# Patient Record
Sex: FEMALE | Race: WHITE | NOT HISPANIC OR LATINO | Employment: OTHER | URBAN - METROPOLITAN AREA
[De-identification: names, ages, dates, MRNs, and addresses within clinical notes are randomized per-mention and may not be internally consistent; named-entity substitution may affect disease eponyms.]

---

## 2024-07-19 ENCOUNTER — TRANSFERRED RECORDS (OUTPATIENT)
Dept: HEALTH INFORMATION MANAGEMENT | Facility: CLINIC | Age: 79
End: 2024-07-19

## 2024-07-23 ENCOUNTER — MEDICAL CORRESPONDENCE (OUTPATIENT)
Dept: HEALTH INFORMATION MANAGEMENT | Facility: CLINIC | Age: 79
End: 2024-07-23

## 2024-07-30 ENCOUNTER — TRANSCRIBE ORDERS (OUTPATIENT)
Dept: OTHER | Age: 79
End: 2024-07-30

## 2024-07-30 DIAGNOSIS — D3A.8 NEUROENDOCRINE TUMOR (H): ICD-10-CM

## 2024-07-30 DIAGNOSIS — R74.8 ELEVATED LIVER ENZYMES: Primary | ICD-10-CM

## 2024-08-01 ENCOUNTER — TELEPHONE (OUTPATIENT)
Dept: GASTROENTEROLOGY | Facility: CLINIC | Age: 79
End: 2024-08-01

## 2024-08-01 NOTE — TELEPHONE ENCOUNTER
Left Voicemail (1st Attempt) for the patient to call back and schedule the following:    Appointment type: new  Provider: Dr. Huddleston  Return date: pre reg  Specialty phone number: [phone]   Additional appointment(s) needed:   Additonal Notes:

## 2024-08-02 ENCOUNTER — TELEPHONE (OUTPATIENT)
Dept: GASTROENTEROLOGY | Facility: CLINIC | Age: 79
End: 2024-08-02
Payer: COMMERCIAL

## 2024-08-02 NOTE — TELEPHONE ENCOUNTER
[facility]     Referring provider: Rodriguez Pete NP   [address] [place]   [place] [address]   Phone: [phone]   Fax: [phone]    Referred to: [facility]     Provider Requested: NA     Referral Received: 7/30/24     Records received: Fax (Media tab); Care Everywhere     Images received: Requested 8/5/24; Uploaded to PACS 8/7/24    Insurance Coverage: [facility]    Evaluation for: R74.8 (ICD-10-CM) - Elevated liver enzymes D3A.8 (ICD-10-CM) - Neuroendocrine tumor (H28)  Additional Information:  Requesting higher level of care, advanced MRI, possible surgery     Clinical History (per RN review):   Referring provider clinic note 7/19/24:  HPI  This is a 79 year old female patient.  She is evaluated in gastroenterology for follow up after ERCP/EUS with Dr Walker, June 11, 2024 and Dr George, [facility], 07/05/2024.     She was last seen in GI clinic, 4/10/2024. There had been an adrupt elevation of her liver enzymes, alk janie of 874, , .  She was dx with Covid January 2024 and thought of possible viral, acetaminophen etiology.  Negative viral hepatitis labs.  Her immunoglobulins, AMA, MENDOZA, SMA, lipase were normal.     Ultrasound found benign liver cysts.    MRI pursued  on 4/21/2024 finding new bilary dilation with focal stricturing.  No mass.  Hepatic cysts up to 12h71e63 mm.     She was feeling well, her liver enzymes had improved to AST 61, ALT 77, alk phos 608.  She traveled during the month of May and returned in June. Labs on 6/11/2024, Alk phos 546, AST 57, ALT 69. CA 19-9, 31.     Concern for abnormal appearance left hepatic lobe, possible cholangiocarcinoma on ERCP/EUS 6/11/2024 per Dr Walker, sphincterectomy, stenting completed.  Cytology negative.     ERCP, Dr George did not appreciate the left liver lobe abnormality but saw a significant dilation of right ducts and stenotic area that did not appear to be cholangiocarcinoma given its intrahepatic  "location.  Impression from Dr George:     The collective findings from Dr. Walker's excellent cholangiogram, the        cholangiograms today, and the direct cholangioscopy today suggest an        extrinsic compression in the area of the right anterior duct. It is        unclear whether this is related to hepatic cysts or a malignant        periductal process. I did not have any targets for biopsies today given        the lack of a fixed stricture. The area suggesting extrinsic compression        was somewhat hyperemic, possibly related to the use of brush cytology        and FNA by Dr. Walker. Given the risks of inducing a bile leak and the        lack of clear targets, I did not randomly sample this area, especially        given the lack of significantly elevated liver tests. I removed the        VIABIL stent at the end of the procedure.        Given the equivocal findings, a second opinion at an academic        institution is needed. I highly recommend a high-quality liver MRI,        perhaps with Eovist, comparing it to the index MRI. Furthermore, I        recommend reviewing both Dr. Walker's cholangiogram and my cholangiogram        as well as his biopsies. If this second opinion concurs with our        evaluation, further workup such as a liver biopsy or hepatobiliary        surgery opinion would be warranted. Of note all contrast drained after        this ERCP further affirming lack of fixed obstruction in the biliary        tree.\"     She complains of fatigue, low abdominal and back pain. Her liver enzymes are improving.  No upper abdominal pain, no nausea, vomiting.  Diarrhea has improved with cholestyramine.   Positive dyspepsia that has persisted despite antiacids.  Denies acid reflux.  No rx PPI.     Colonoscopy, Dr. Nii Lyles, May 2022.  Two polyps in the descending colon, 4-5 mm size, tubular adenoma.     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     Carcinoid tumor resection April 2018, pathology differentiated neuroendocrine tumor, grade 1 of 3, 3.5 cm with free margins.  She is followed by oncology.  5-HIAA, 2023 was normal at 5.8.     Right hemocolectomy per Dr. Monterroso, January 7, 2019, pathology showing grade 2 of 4 invading but not through the muscularis propria, sessile serrated adenoma, margins free of tumor.  26 lymph nodes, negative for metastatic carcinoma.     Fibroscan, 12/2021, mild steatosis with no significant fibrosis.     No diabetes.  Glucose normal.       Her weight is stable from her last visit, down 30 # since 2021.      EXAM: MR ABDOMEN MRCP 7/15/24  INDICATION: Biliary obstruction suspected (Ped 0-18y);    COMPARISON: Abdominal MRI and MRCP 4/21/2024.   FINDINGS: No change in the intraparotid biliary dilatation greater on the right hepatic lobe. Focal area of stricturing near the hepatic hilum. No discrete mass visualized. Common bile duct is normal in caliber. No filling defect or stricture in the common bile duct. Stable hepatic and renal cysts. L2-3 posterior margarito and pedicle screw fixation.   IMPRESSION: Unchanged intrahepatic biliary dilatation, greater on the right. Focal area of stricturing in the central liver. No discrete mass.     MR ABDOMEN MRCP, MR ABDOMEN WO W CONTRAST 4/22/24  INDICATION: Biliary obstruction suspected (Ped 0-18y);   IMPRESSION:   1. New intrahepatic biliary dilatation, greater in the right hepatic lobe. There appears to be a focal area of stricturing in the central liver. No discrete mass is seen. Recommend ERCP for further evaluation.   2. New geographic areas of altered enhancement involving the liver, favored to be related to the biliary dilatation and likely infectious/inflammatory in etiology.   3. Suboptimal evaluation of the portal veins due to motion, but no obvious thrombus.     MD review date: 8/13/24 Dr. Jesse POMPA Decision for clinic  consultation/Orders:   Let them complete a MRI with Eovist and MRCP. We will need EUS and ERCP      Referral updates/Patient contacted: 8/13/24  Call returned to Concha CARTER [phone]. Message reported her from [employer], but phone number provided connected to [email or web address]. Unable to reach or leave voicemail on nurse line.

## 2024-08-13 ENCOUNTER — PATIENT OUTREACH (OUTPATIENT)
Dept: GASTROENTEROLOGY | Facility: CLINIC | Age: 79
End: 2024-08-13
Payer: COMMERCIAL

## 2024-08-13 ENCOUNTER — PREP FOR PROCEDURE (OUTPATIENT)
Dept: GASTROENTEROLOGY | Facility: CLINIC | Age: 79
End: 2024-08-13
Payer: COMMERCIAL

## 2024-08-13 DIAGNOSIS — K83.8 DILATION OF BILIARY TRACT: ICD-10-CM

## 2024-08-13 DIAGNOSIS — R79.89 ELEVATED LFTS: Primary | ICD-10-CM

## 2024-08-13 NOTE — PROGRESS NOTES
Please assist in scheduling:     Procedure/Imaging/Clinic: EUS (Endoscopic Ultrasound) and ERCP (Endoscopic Retrograde Cholangiopancreatography)  Physician: Jesse  Timing: Next available  Scope time: Provider average  Anesthesia: General  Dx: Elevated LFTs; biliary dilation  Tier:3  Location: [facility]  Patient communication letter header: EUS (Endoscopic Ultrasound) and ERCP (Endoscopic Retrograde Cholangiopancreatography)

## 2024-08-13 NOTE — PROGRESS NOTES
Following review of referral, per Dr. Molina: We will need EUS and ERCP.     Please assist in scheduling:     Procedure/Imaging/Clinic: EUS (Endoscopic Ultrasound) and ERCP (Endoscopic Retrograde Cholangiopancreatography)  Physician: Jesse  Timing: Next available  Scope time: Provider average  Anesthesia: General  Dx: Elevated LFTs; biliary dilation  Tier:3  Location: [facility]  Patient communication letter header: EUS (Endoscopic Ultrasound) and ERCP (Endoscopic Retrograde Cholangiopancreatography)    Called to discuss with patient. Offered next available date 9/13; patient is unavailable that date. Agreeable to schedule for 9/30/24 following her vacation.     Patient will need a , someone to stay with them for 24 hours and should stay in town for 24 hours (within 45 min of Hospital) post procedure.    Patient will need a pre-op physical within 30 days of procedure. If outside  Health system, will need physical faxed to number [phone]   If you do not get a preop physical, your procedure could be cancelled, patient voiced understanding*    Preop Plan: PCP with [facility].    Does patient have any history of gastric bypass/gastric surgery/altered panc/bili anatomy? No    Does patient have [employer] insurance? No    Med Review    Blood thinner -  None  ASA - None  Diabetic - None   Injectable or oral medications for weight loss - None    Patient Education r/t procedure: Discussion / MyChart account pending.     A pre-op nurse will call 1-2 days prior to the procedure.    NPO/Prep: No solid food 8 hours prior to arrival at the hospital. Clear liquids okay until 2 hours prior to arrival.     Other specific details/comments: None     Advised to contact clinic in the event of Covid symptoms or known exposure within 14 days of procedure: Pre-procedure.     Verbalized understanding of all instructions. All questions answered. Clinic contact and scheduling numbers verified for future  questions/concerns. Message routed to OR scheduling.     Tamika Michelle, RN Care Coordinator

## 2024-08-19 ENCOUNTER — PATIENT OUTREACH (OUTPATIENT)
Dept: GASTROENTEROLOGY | Facility: CLINIC | Age: 79
End: 2024-08-19
Payer: COMMERCIAL

## 2024-08-19 NOTE — PROGRESS NOTES
Attempted to reach patient to offer earlier procedure date with Dr. Molina. LVM on mobile and with .     Tamika Michelle, RN Care Coordinator

## 2024-08-20 RX ORDER — LOSARTAN POTASSIUM 50 MG/1
50 TABLET ORAL AT BEDTIME
COMMUNITY

## 2024-08-20 RX ORDER — BUPROPION HYDROCHLORIDE 300 MG/1
300 TABLET ORAL EVERY MORNING
COMMUNITY

## 2024-08-20 RX ORDER — TRAZODONE HYDROCHLORIDE 50 MG/1
100 TABLET, FILM COATED ORAL AT BEDTIME
COMMUNITY

## 2024-08-20 RX ORDER — CHLORAL HYDRATE 500 MG
1 CAPSULE ORAL DAILY
COMMUNITY

## 2024-08-20 RX ORDER — GABAPENTIN 300 MG/1
900 CAPSULE ORAL 3 TIMES DAILY
COMMUNITY

## 2024-08-20 RX ORDER — ACETAMINOPHEN 325 MG/1
325-650 TABLET ORAL EVERY 6 HOURS PRN
COMMUNITY

## 2024-08-20 RX ORDER — CHOLESTYRAMINE 4 G/9G
4 POWDER, FOR SUSPENSION ORAL
COMMUNITY

## 2024-08-20 NOTE — PROGRESS NOTES
Spoke with Holly. She is agreeable to move her case with Dr. Molina from 9/30/24 to 8/21/24. Message routed to OR . No med holds. H&P day of procedure.     Tamika Michelle, RN Care Coordinator

## 2024-08-21 ENCOUNTER — APPOINTMENT (OUTPATIENT)
Dept: GENERAL RADIOLOGY | Facility: CLINIC | Age: 79
End: 2024-08-21
Attending: INTERNAL MEDICINE
Payer: COMMERCIAL

## 2024-08-21 ENCOUNTER — ANESTHESIA EVENT (OUTPATIENT)
Dept: SURGERY | Facility: CLINIC | Age: 79
End: 2024-08-21
Payer: COMMERCIAL

## 2024-08-21 ENCOUNTER — ANESTHESIA (OUTPATIENT)
Dept: SURGERY | Facility: CLINIC | Age: 79
End: 2024-08-21
Payer: COMMERCIAL

## 2024-08-21 ENCOUNTER — HOSPITAL ENCOUNTER (OUTPATIENT)
Facility: CLINIC | Age: 79
Discharge: HOME | End: 2024-08-21
Attending: INTERNAL MEDICINE | Admitting: INTERNAL MEDICINE
Payer: COMMERCIAL

## 2024-08-21 VITALS
BODY MASS INDEX: 27.74 KG/M2 | HEART RATE: 76 BPM | OXYGEN SATURATION: 96 % | HEIGHT: 64 IN | TEMPERATURE: 97.4 F | RESPIRATION RATE: 14 BRPM | DIASTOLIC BLOOD PRESSURE: 81 MMHG | WEIGHT: 162.48 LBS | SYSTOLIC BLOOD PRESSURE: 152 MMHG

## 2024-08-21 DIAGNOSIS — D3A.8 NEUROENDOCRINE TUMOR (H): ICD-10-CM

## 2024-08-21 DIAGNOSIS — K83.1 BILIARY STRICTURE (H): Primary | ICD-10-CM

## 2024-08-21 DIAGNOSIS — R74.8 ELEVATED LIVER ENZYMES: ICD-10-CM

## 2024-08-21 LAB
ALBUMIN SERPL BCG-MCNC: 4.3 G/DL (ref 3.5–5.2)
ALP SERPL-CCNC: 508 U/L (ref 40–150)
ALT SERPL W P-5'-P-CCNC: 52 U/L (ref 0–50)
AMYLASE SERPL-CCNC: 35 U/L (ref 28–100)
ANION GAP SERPL CALCULATED.3IONS-SCNC: 11 MMOL/L (ref 7–15)
AST SERPL W P-5'-P-CCNC: 49 U/L (ref 0–45)
BASOPHILS # BLD AUTO: 0 10E3/UL (ref 0–0.2)
BASOPHILS NFR BLD AUTO: 1 %
BILIRUB SERPL-MCNC: 0.9 MG/DL
BUN SERPL-MCNC: 16.6 MG/DL (ref 8–23)
CALCIUM SERPL-MCNC: 10.1 MG/DL (ref 8.8–10.4)
CHLORIDE SERPL-SCNC: 106 MMOL/L (ref 98–107)
CREAT SERPL-MCNC: 0.61 MG/DL (ref 0.51–0.95)
EGFRCR SERPLBLD CKD-EPI 2021: >90 ML/MIN/1.73M2
EOSINOPHIL # BLD AUTO: 0.2 10E3/UL (ref 0–0.7)
EOSINOPHIL NFR BLD AUTO: 3 %
ERCP: NORMAL
ERYTHROCYTE [DISTWIDTH] IN BLOOD BY AUTOMATED COUNT: 15.9 % (ref 10–15)
GLUCOSE SERPL-MCNC: 92 MG/DL (ref 70–99)
HCO3 SERPL-SCNC: 26 MMOL/L (ref 22–29)
HCT VFR BLD AUTO: 42.2 % (ref 35–47)
HGB BLD-MCNC: 13.8 G/DL (ref 11.7–15.7)
IMM GRANULOCYTES # BLD: 0 10E3/UL
IMM GRANULOCYTES NFR BLD: 0 %
INR PPP: 0.93 (ref 0.85–1.15)
LIPASE SERPL-CCNC: 37 U/L (ref 13–60)
LYMPHOCYTES # BLD AUTO: 2.2 10E3/UL (ref 0.8–5.3)
LYMPHOCYTES NFR BLD AUTO: 39 %
MCH RBC QN AUTO: 32 PG (ref 26.5–33)
MCHC RBC AUTO-ENTMCNC: 32.7 G/DL (ref 31.5–36.5)
MCV RBC AUTO: 98 FL (ref 78–100)
MONOCYTES # BLD AUTO: 0.4 10E3/UL (ref 0–1.3)
MONOCYTES NFR BLD AUTO: 8 %
NEUTROPHILS # BLD AUTO: 2.8 10E3/UL (ref 1.6–8.3)
NEUTROPHILS NFR BLD AUTO: 49 %
NRBC # BLD AUTO: 0 10E3/UL
NRBC BLD AUTO-RTO: 0 /100
PLATELET # BLD AUTO: 238 10E3/UL (ref 150–450)
POTASSIUM SERPL-SCNC: 3.7 MMOL/L (ref 3.4–5.3)
PROT SERPL-MCNC: 7 G/DL (ref 6.4–8.3)
RBC # BLD AUTO: 4.31 10E6/UL (ref 3.8–5.2)
SODIUM SERPL-SCNC: 143 MMOL/L (ref 135–145)
WBC # BLD AUTO: 5.7 10E3/UL (ref 4–11)

## 2024-08-21 PROCEDURE — C1877 STENT, NON-COAT/COV W/O DEL: HCPCS | Performed by: INTERNAL MEDICINE

## 2024-08-21 PROCEDURE — 99100 ANES PT EXTEME AGE<1 YR&>70: CPT | Performed by: ANESTHESIOLOGY

## 2024-08-21 PROCEDURE — 258N000003 HC RX IP 258 OP 636

## 2024-08-21 PROCEDURE — 83690 ASSAY OF LIPASE: CPT | Performed by: INTERNAL MEDICINE

## 2024-08-21 PROCEDURE — C1889 IMPLANT/INSERT DEVICE, NOC: HCPCS | Performed by: INTERNAL MEDICINE

## 2024-08-21 PROCEDURE — 250N000025 HC SEVOFLURANE, PER MIN: Performed by: INTERNAL MEDICINE

## 2024-08-21 PROCEDURE — 88342 IMHCHEM/IMCYTCHM 1ST ANTB: CPT | Mod: 26 | Performed by: PATHOLOGY

## 2024-08-21 PROCEDURE — 88342 IMHCHEM/IMCYTCHM 1ST ANTB: CPT | Mod: TC | Performed by: INTERNAL MEDICINE

## 2024-08-21 PROCEDURE — 999N000179 XR SURGERY CARM FLUORO LESS THAN 5 MIN W STILLS: Mod: TC

## 2024-08-21 PROCEDURE — 88307 TISSUE EXAM BY PATHOLOGIST: CPT | Mod: 26 | Performed by: PATHOLOGY

## 2024-08-21 PROCEDURE — 255N000002 HC RX 255 OP 636: Performed by: INTERNAL MEDICINE

## 2024-08-21 PROCEDURE — 250N000011 HC RX IP 250 OP 636

## 2024-08-21 PROCEDURE — 82150 ASSAY OF AMYLASE: CPT | Performed by: INTERNAL MEDICINE

## 2024-08-21 PROCEDURE — 36415 COLL VENOUS BLD VENIPUNCTURE: CPT | Performed by: INTERNAL MEDICINE

## 2024-08-21 PROCEDURE — 272N000002 HC OR SUPPLY OTHER OPNP: Performed by: INTERNAL MEDICINE

## 2024-08-21 PROCEDURE — 360N000083 HC SURGERY LEVEL 3 W/ FLUORO, PER MIN: Performed by: INTERNAL MEDICINE

## 2024-08-21 PROCEDURE — 710N000012 HC RECOVERY PHASE 2, PER MINUTE: Performed by: INTERNAL MEDICINE

## 2024-08-21 PROCEDURE — 85025 COMPLETE CBC W/AUTO DIFF WBC: CPT | Performed by: INTERNAL MEDICINE

## 2024-08-21 PROCEDURE — C1726 CATH, BAL DIL, NON-VASCULAR: HCPCS | Performed by: INTERNAL MEDICINE

## 2024-08-21 PROCEDURE — 80053 COMPREHEN METABOLIC PANEL: CPT | Performed by: INTERNAL MEDICINE

## 2024-08-21 PROCEDURE — 88313 SPECIAL STAINS GROUP 2: CPT | Mod: 26 | Performed by: PATHOLOGY

## 2024-08-21 PROCEDURE — 250N000009 HC RX 250: Performed by: INTERNAL MEDICINE

## 2024-08-21 PROCEDURE — 43260 ERCP W/SPECIMEN COLLECTION: CPT | Performed by: ANESTHESIOLOGY

## 2024-08-21 PROCEDURE — 99100 ANES PT EXTEME AGE<1 YR&>70: CPT

## 2024-08-21 PROCEDURE — 88172 CYTP DX EVAL FNA 1ST EA SITE: CPT | Mod: 26 | Performed by: PATHOLOGY

## 2024-08-21 PROCEDURE — 370N000017 HC ANESTHESIA TECHNICAL FEE, PER MIN: Performed by: INTERNAL MEDICINE

## 2024-08-21 PROCEDURE — 86301 IMMUNOASSAY TUMOR CA 19-9: CPT | Performed by: INTERNAL MEDICINE

## 2024-08-21 PROCEDURE — 88305 TISSUE EXAM BY PATHOLOGIST: CPT | Mod: 26 | Performed by: PATHOLOGY

## 2024-08-21 PROCEDURE — 999N000141 HC STATISTIC PRE-PROCEDURE NURSING ASSESSMENT: Performed by: INTERNAL MEDICINE

## 2024-08-21 PROCEDURE — 85610 PROTHROMBIN TIME: CPT | Performed by: INTERNAL MEDICINE

## 2024-08-21 PROCEDURE — 710N000010 HC RECOVERY PHASE 1, LEVEL 2, PER MIN: Performed by: INTERNAL MEDICINE

## 2024-08-21 PROCEDURE — 250N000009 HC RX 250

## 2024-08-21 PROCEDURE — C1769 GUIDE WIRE: HCPCS | Performed by: INTERNAL MEDICINE

## 2024-08-21 PROCEDURE — 43260 ERCP W/SPECIMEN COLLECTION: CPT

## 2024-08-21 PROCEDURE — 272N000001 HC OR GENERAL SUPPLY STERILE: Performed by: INTERNAL MEDICINE

## 2024-08-21 PROCEDURE — 88305 TISSUE EXAM BY PATHOLOGIST: CPT | Mod: TC | Performed by: INTERNAL MEDICINE

## 2024-08-21 PROCEDURE — 88173 CYTOPATH EVAL FNA REPORT: CPT | Mod: 26 | Performed by: PATHOLOGY

## 2024-08-21 DEVICE — A STERILE NON-BIOABSORBABLE TUBULAR DEVICE INTENDED TO BE IMPLANTED IN AN OBSTRUCTED PANCREATIC DUCT (E.G., DUE TO STRICTURE OR MALIGNANCY) TO MAINTAIN LUMINAL PATENCY; IT MAY ALSO BE INTENDED TO TREAT A WIDE VARIETY OF CONDITIONS IN PANCREATIC ENDOSCOPY (E.G., DRAIN PSEUDOCYST, TREAT FISTULA OR DUCT DISRUPTION). IT IS MADE ENTIRELY OF A SYNTHETIC POLYMER AND HAS A CONTINUOUS TUBE DESIGN WITH OR WITHOUT RETENTION FLANGES. THIS IS A SINGLE-USE DEVICE.
Type: IMPLANTABLE DEVICE | Site: BILE DUCT | Status: FUNCTIONAL
Brand: FREEMAN PANCREATIC FLEXI-STENT

## 2024-08-21 RX ORDER — FENTANYL CITRATE 50 UG/ML
25 INJECTION, SOLUTION INTRAMUSCULAR; INTRAVENOUS EVERY 5 MIN PRN
Status: DISCONTINUED | OUTPATIENT
Start: 2024-08-21 | End: 2024-08-21 | Stop reason: HOSPADM

## 2024-08-21 RX ORDER — FLUMAZENIL 0.1 MG/ML
0.2 INJECTION, SOLUTION INTRAVENOUS
Status: DISCONTINUED | OUTPATIENT
Start: 2024-08-21 | End: 2024-08-21 | Stop reason: HOSPADM

## 2024-08-21 RX ORDER — INDOMETHACIN 50 MG/1
SUPPOSITORY RECTAL PRN
Status: DISCONTINUED | OUTPATIENT
Start: 2024-08-21 | End: 2024-08-21 | Stop reason: HOSPADM

## 2024-08-21 RX ORDER — NALOXONE HYDROCHLORIDE 0.4 MG/ML
0.4 INJECTION, SOLUTION INTRAMUSCULAR; INTRAVENOUS; SUBCUTANEOUS
Status: DISCONTINUED | OUTPATIENT
Start: 2024-08-21 | End: 2024-08-21 | Stop reason: HOSPADM

## 2024-08-21 RX ORDER — NALOXONE HYDROCHLORIDE 0.4 MG/ML
0.2 INJECTION, SOLUTION INTRAMUSCULAR; INTRAVENOUS; SUBCUTANEOUS
Status: DISCONTINUED | OUTPATIENT
Start: 2024-08-21 | End: 2024-08-21 | Stop reason: HOSPADM

## 2024-08-21 RX ORDER — LEVOFLOXACIN 5 MG/ML
INJECTION, SOLUTION INTRAVENOUS PRN
Status: DISCONTINUED | OUTPATIENT
Start: 2024-08-21 | End: 2024-08-21

## 2024-08-21 RX ORDER — SODIUM CHLORIDE, SODIUM LACTATE, POTASSIUM CHLORIDE, CALCIUM CHLORIDE 600; 310; 30; 20 MG/100ML; MG/100ML; MG/100ML; MG/100ML
INJECTION, SOLUTION INTRAVENOUS CONTINUOUS
Status: DISCONTINUED | OUTPATIENT
Start: 2024-08-21 | End: 2024-08-21 | Stop reason: HOSPADM

## 2024-08-21 RX ORDER — LIDOCAINE 40 MG/G
CREAM TOPICAL
Status: DISCONTINUED | OUTPATIENT
Start: 2024-08-21 | End: 2024-08-21 | Stop reason: HOSPADM

## 2024-08-21 RX ORDER — DEXAMETHASONE SODIUM PHOSPHATE 4 MG/ML
INJECTION, SOLUTION INTRA-ARTICULAR; INTRALESIONAL; INTRAMUSCULAR; INTRAVENOUS; SOFT TISSUE PRN
Status: DISCONTINUED | OUTPATIENT
Start: 2024-08-21 | End: 2024-08-21

## 2024-08-21 RX ORDER — ONDANSETRON 4 MG/1
4 TABLET, ORALLY DISINTEGRATING ORAL EVERY 6 HOURS PRN
Status: DISCONTINUED | OUTPATIENT
Start: 2024-08-21 | End: 2024-08-21 | Stop reason: HOSPADM

## 2024-08-21 RX ORDER — ONDANSETRON 2 MG/ML
4 INJECTION INTRAMUSCULAR; INTRAVENOUS EVERY 30 MIN PRN
Status: DISCONTINUED | OUTPATIENT
Start: 2024-08-21 | End: 2024-08-21 | Stop reason: HOSPADM

## 2024-08-21 RX ORDER — OXYCODONE HYDROCHLORIDE 5 MG/1
5 TABLET ORAL
Status: DISCONTINUED | OUTPATIENT
Start: 2024-08-21 | End: 2024-08-21 | Stop reason: HOSPADM

## 2024-08-21 RX ORDER — NALOXONE HYDROCHLORIDE 0.4 MG/ML
0.1 INJECTION, SOLUTION INTRAMUSCULAR; INTRAVENOUS; SUBCUTANEOUS
Status: DISCONTINUED | OUTPATIENT
Start: 2024-08-21 | End: 2024-08-21 | Stop reason: HOSPADM

## 2024-08-21 RX ORDER — IOPAMIDOL 510 MG/ML
INJECTION, SOLUTION INTRAVASCULAR PRN
Status: DISCONTINUED | OUTPATIENT
Start: 2024-08-21 | End: 2024-08-21 | Stop reason: HOSPADM

## 2024-08-21 RX ORDER — SODIUM CHLORIDE, SODIUM LACTATE, POTASSIUM CHLORIDE, CALCIUM CHLORIDE 600; 310; 30; 20 MG/100ML; MG/100ML; MG/100ML; MG/100ML
INJECTION, SOLUTION INTRAVENOUS CONTINUOUS PRN
Status: DISCONTINUED | OUTPATIENT
Start: 2024-08-21 | End: 2024-08-21

## 2024-08-21 RX ORDER — LIDOCAINE HYDROCHLORIDE 20 MG/ML
INJECTION, SOLUTION INFILTRATION; PERINEURAL PRN
Status: DISCONTINUED | OUTPATIENT
Start: 2024-08-21 | End: 2024-08-21

## 2024-08-21 RX ORDER — ONDANSETRON 4 MG/1
4 TABLET, ORALLY DISINTEGRATING ORAL EVERY 30 MIN PRN
Status: DISCONTINUED | OUTPATIENT
Start: 2024-08-21 | End: 2024-08-21 | Stop reason: HOSPADM

## 2024-08-21 RX ORDER — PROPOFOL 10 MG/ML
INJECTION, EMULSION INTRAVENOUS PRN
Status: DISCONTINUED | OUTPATIENT
Start: 2024-08-21 | End: 2024-08-21

## 2024-08-21 RX ORDER — ONDANSETRON 2 MG/ML
4 INJECTION INTRAMUSCULAR; INTRAVENOUS EVERY 6 HOURS PRN
Status: DISCONTINUED | OUTPATIENT
Start: 2024-08-21 | End: 2024-08-21 | Stop reason: HOSPADM

## 2024-08-21 RX ORDER — ONDANSETRON 2 MG/ML
INJECTION INTRAMUSCULAR; INTRAVENOUS PRN
Status: DISCONTINUED | OUTPATIENT
Start: 2024-08-21 | End: 2024-08-21

## 2024-08-21 RX ORDER — FENTANYL CITRATE 50 UG/ML
INJECTION, SOLUTION INTRAMUSCULAR; INTRAVENOUS PRN
Status: DISCONTINUED | OUTPATIENT
Start: 2024-08-21 | End: 2024-08-21

## 2024-08-21 RX ORDER — HYDROMORPHONE HCL IN WATER/PF 6 MG/30 ML
0.2 PATIENT CONTROLLED ANALGESIA SYRINGE INTRAVENOUS EVERY 5 MIN PRN
Status: DISCONTINUED | OUTPATIENT
Start: 2024-08-21 | End: 2024-08-21 | Stop reason: HOSPADM

## 2024-08-21 RX ADMIN — LEVOFLOXACIN 500 MG: 5 INJECTION, SOLUTION INTRAVENOUS at 14:41

## 2024-08-21 RX ADMIN — PHENYLEPHRINE HYDROCHLORIDE 150 MCG: 10 INJECTION INTRAVENOUS at 15:31

## 2024-08-21 RX ADMIN — SODIUM CHLORIDE, POTASSIUM CHLORIDE, SODIUM LACTATE AND CALCIUM CHLORIDE: 600; 310; 30; 20 INJECTION, SOLUTION INTRAVENOUS at 13:35

## 2024-08-21 RX ADMIN — DEXAMETHASONE SODIUM PHOSPHATE 4 MG: 4 INJECTION, SOLUTION INTRA-ARTICULAR; INTRALESIONAL; INTRAMUSCULAR; INTRAVENOUS; SOFT TISSUE at 13:51

## 2024-08-21 RX ADMIN — Medication 50 MG: at 13:48

## 2024-08-21 RX ADMIN — FENTANYL CITRATE 25 MCG: 50 INJECTION INTRAMUSCULAR; INTRAVENOUS at 13:47

## 2024-08-21 RX ADMIN — ONDANSETRON 4 MG: 2 INJECTION INTRAMUSCULAR; INTRAVENOUS at 15:46

## 2024-08-21 RX ADMIN — PHENYLEPHRINE HYDROCHLORIDE 100 MCG: 10 INJECTION INTRAVENOUS at 15:35

## 2024-08-21 RX ADMIN — LIDOCAINE HYDROCHLORIDE 60 MG: 20 INJECTION, SOLUTION INFILTRATION; PERINEURAL at 13:47

## 2024-08-21 RX ADMIN — NOREPINEPHRINE BITARTRATE 6.4 MCG: 1 INJECTION, SOLUTION, CONCENTRATE INTRAVENOUS at 15:37

## 2024-08-21 RX ADMIN — Medication 20 MG: at 14:30

## 2024-08-21 RX ADMIN — PROPOFOL 100 MG: 10 INJECTION, EMULSION INTRAVENOUS at 13:47

## 2024-08-21 RX ADMIN — FENTANYL CITRATE 25 MCG: 50 INJECTION INTRAMUSCULAR; INTRAVENOUS at 14:30

## 2024-08-21 RX ADMIN — SUGAMMADEX 200 MG: 100 INJECTION, SOLUTION INTRAVENOUS at 15:46

## 2024-08-21 ASSESSMENT — ACTIVITIES OF DAILY LIVING (ADL)
ADLS_ACUITY_SCORE: 31
ADLS_ACUITY_SCORE: 31
ADLS_ACUITY_SCORE: 32
ADLS_ACUITY_SCORE: 31
ADLS_ACUITY_SCORE: 31
ADLS_ACUITY_SCORE: 32
ADLS_ACUITY_SCORE: 31

## 2024-08-21 NOTE — ANESTHESIA PREPROCEDURE EVALUATION
"Anesthesia Pre-Procedure Evaluation    Patient: Holly Stearns   MRN: [id number] : 1945        Procedure : Procedure(s):  ENDOSCOPIC ULTRASOUND, ESOPHAGOSCOPY / UPPER GASTROINTESTINAL TRACT (GI)  ENDOSCOPIC RETROGRADE CHOLANGIOPANCREATOGRAPHY          Past Medical History:   Diagnosis Date    Gastroesophageal reflux disease     Hypertension       Past Surgical History:   Procedure Laterality Date    ABDOMEN SURGERY      BACK SURGERY      EYE SURGERY      GENITOURINARY SURGERY      GI SURGERY      GYN SURGERY      IR ERCP  2024    ORTHOPEDIC SURGERY        Allergies   Allergen Reactions    Codeine Nausea    Dust Mites     Morphine Nausea and Vomiting    Adhesive Tape Rash      Social History     Tobacco Use    Smoking status: Former     Current packs/day: 0.50     Types: Cigarettes    Smokeless tobacco: Never    Tobacco comments:     Quit smoking    Substance Use Topics    Alcohol use: Yes      Wt Readings from Last 1 Encounters:   No data found for Wt        Anesthesia Evaluation   Pt has had prior anesthetic. Type: General and MAC.        ROS/MED HX  ENT/Pulmonary:       Neurologic:       Cardiovascular:     (+)  hypertension- -   -  - -                                      METS/Exercise Tolerance:     Hematologic:       Musculoskeletal:       GI/Hepatic:     (+) GERD,                   Renal/Genitourinary:       Endo:       Psychiatric/Substance Use:       Infectious Disease:       Malignancy:       Other:               OUTSIDE LABS:  CBC: No results found for: \"WBC\", \"HGB\", \"HCT\", \"PLT\"  BMP: No results found for: \"NA\", \"POTASSIUM\", \"CHLORIDE\", \"CO2\", \"BUN\", \"CR\", \"GLC\"  COAGS: No results found for: \"PTT\", \"INR\", \"FIBR\"  POC: No results found for: \"BGM\", \"HCG\", \"HCGS\"  HEPATIC: No results found for: \"ALBUMIN\", \"PROTTOTAL\", \"ALT\", \"AST\", \"GGT\", \"ALKPHOS\", \"BILITOTAL\", \"BILIDIRECT\", \"CHUCK\"  OTHER: No results found for: \"PH\", \"LACT\", \"A1C\", \"ALBERT\", \"PHOS\", \"MAG\", \"LIPASE\", \"AMYLASE\", \"TSH\", \"T4\", " "\"T3\", \"CRP\", \"SED\"    Anesthesia Plan    ASA Status:  3       Anesthesia Type: MAC.     - Reason for MAC: immobility needed, straight local not clinically adequate              Consents    Anesthesia Plan(s) and associated risks, benefits, and realistic alternatives discussed. Questions answered and patient/representative(s) expressed understanding.     - Discussed: Risks, Benefits and Alternatives for BOTH SEDATION and the PROCEDURE were discussed     - Discussed with:             Postoperative Care       PONV prophylaxis: Ondansetron (or other 5HT-3), Background Propofol Infusion     Comments:               Cielo Hernandez MD    I have reviewed the pertinent notes and labs in the chart from the past 30 days and (re)examined the patient.  Any updates or changes from those notes are reflected in this note.                  "

## 2024-08-21 NOTE — DISCHARGE INSTRUCTIONS
- Observe patient in same day observation unit for ongoing care.   - Will repeat Ca 19-9 and have the MRIs re-read by our radiologists    - Will await for the liver biopsy results   - Confirm spontaneous stent passage by performing a flat and upright abdominal x-ray in 4 weeks.    - If stent is present,will need an upper endoscopy under moderate conscious sedation in [address] for biliary stent removal.    - The findings and recommendations were discussed with the patient.   ***No Aspirin or Blood thinners for 3 full days per Dr. Molina***    Contacting your Doctor -   To contact a doctor, call Dr. Choi' office at [phone]  or:  [phone] and ask for the resident on call for Gastroenterology (answered 24 hours a day)   Emergency Department:  [facility]: [phone] 911 if you are in need of immediate or emergent help

## 2024-08-21 NOTE — ANESTHESIA CARE TRANSFER NOTE
Patient: Holly Stearns    Procedure: Procedure(s):  ENDOSCOPIC ULTRASOUND, ESOPHAGOSCOPY / UPPER GASTROINTESTINAL TRACT (GI) with fine needle aspiration and liver biopsy  ENDOSCOPIC RETROGRADE CHOLANGIOPANCREATOGRAPHY with debris removal and biliary stent placement.       Diagnosis: Elevated LFTs [R79.89]  Dilation of biliary tract [K83.8]  Diagnosis Additional Information: No value filed.    Anesthesia Type:   MAC     Note:    Oropharynx: oropharynx clear of all foreign objects  Level of Consciousness: awake  Oxygen Supplementation: face mask  Level of Supplemental Oxygen (L/min / FiO2): 8  Independent Airway: airway patency satisfactory and stable  Dentition: dentition unchanged  Vital Signs Stable: post-procedure vital signs reviewed and stable  Report to RN Given: handoff report given  Patient transferred to: PACU    Handoff Report: Identifed the Patient, Identified the Reponsible Provider, Reviewed the pertinent medical history, Discussed the surgical course, Reviewed Intra-OP anesthesia mangement and issues during anesthesia, Set expectations for post-procedure period and Allowed opportunity for questions and acknowledgement of understanding      Vitals:  Vitals Value Taken Time   /86 08/21/24 1600   Temp     Pulse 59 08/21/24 1600   Resp 16 08/21/24 1600   SpO2 93 % 08/21/24 1600   Vitals shown include unfiled device data.    Electronically Signed By: ZE Kelly CRNA  August 21, 2024  4:01 PM

## 2024-08-21 NOTE — PROGRESS NOTES
"Holly Stearns is a 79 year old female patient.  1. Biliary stricture (H28)      Past Medical History:   Diagnosis Date    Gastroesophageal reflux disease     Hypertension      No current outpatient medications on file.     Allergies   Allergen Reactions    Codeine Nausea    Dust Mites     Morphine Nausea and Vomiting    Adhesive Tape Rash     Active Problems:    * No active hospital problems. *    Blood pressure (!) 145/86, pulse 58, temperature (!) 95.2  F (35.1  C), temperature source Axillary, resp. rate 19, height 1.626 m (5' 4\"), weight 73.7 kg (162 lb 7.7 oz), SpO2 98%.    Subjective  Objective  Assessment & Plan    Claudia Tubbs RN  8/21/2024    Dr. Molina will address op-note. Stated that he placed op-note. Printed note brought to bedside and will be placed in chart.   "

## 2024-08-21 NOTE — ANESTHESIA PROCEDURE NOTES
Airway       Patient location during procedure: OR       Procedure Start/Stop Times: 8/21/2024 1:51 PM  Staff -        Anesthesiologist:  Cielo Hernandez MD       CRNA: Bon Bolanos APRN CRNA       Other Anesthesia Staff: Yuridia Ferreira       Performed By: SRNA  Consent for Airway        Urgency: elective  Indications and Patient Condition       Indications for airway management: cecelia-procedural       Induction type:intravenous       Mask difficulty assessment: 2 - vent by mask + OA or adjuvant +/- NMBA    Final Airway Details       Final airway type: endotracheal airway       Successful airway: ETT - single  Endotracheal Airway Details        ETT size (mm): 7.0       Cuffed: yes       Cuff volume (mL): 7       Successful intubation technique: video laryngoscopy (attempted DL with Carbone 2 x 1, unsuccessful. Successful with MAC 3 glidescope)       VL Blade Size: MAC 3       Grade View of Cords: 2       Adjucts: stylet       Position: Right       Measured from: gums/teeth       Bite block used: None    Post intubation assessment        Placement verified by: capnometry, equal breath sounds and chest rise        Number of attempts at approach: 1       Number of other approaches attempted: 0       Secured with: tape       Ease of procedure: easy       Dentition: Intact and Unchanged    Medication(s) Administered   Medication Administration Time: 8/21/2024 1:51 PM

## 2024-08-21 NOTE — ANESTHESIA POSTPROCEDURE EVALUATION
Patient: Holly Stearns    Procedure: Procedure(s):  ENDOSCOPIC ULTRASOUND, ESOPHAGOSCOPY / UPPER GASTROINTESTINAL TRACT (GI) with fine needle aspiration and liver biopsy  ENDOSCOPIC RETROGRADE CHOLANGIOPANCREATOGRAPHY with debris removal and biliary stent placement.       Anesthesia Type:  MAC    Note:  Disposition: Outpatient   Postop Pain Control: Uneventful            Sign Out: Well controlled pain   PONV: No   Neuro/Psych: Uneventful            Sign Out: Acceptable/Baseline neuro status   Airway/Respiratory: Uneventful            Sign Out: Acceptable/Baseline resp. status   CV/Hemodynamics: Uneventful            Sign Out: Acceptable CV status; No obvious hypovolemia; No obvious fluid overload   Other NRE: NONE   DID A NON-ROUTINE EVENT OCCUR? No           Last vitals:  Vitals Value Taken Time   /86 08/21/24 1600   Temp 34.1  C (93.3  F) 08/21/24 1600   Pulse 56 08/21/24 1605   Resp 12 08/21/24 1605   SpO2 96 % 08/21/24 1605   Vitals shown include unfiled device data.    Electronically Signed By: Cielo Hernandez MD  August 21, 2024  4:06 PM

## 2024-08-22 NOTE — OR NURSING
Dr. Molina saw patient in PACU, stated MD did not need to see patient again once labs have been drawn.

## 2024-08-23 LAB — CANCER AG19-9 SERPL IA-ACNC: 12 U/ML

## 2024-08-26 LAB
PATH REPORT.COMMENTS IMP SPEC: NORMAL
PATH REPORT.FINAL DX SPEC: NORMAL
PATH REPORT.GROSS SPEC: NORMAL
PATH REPORT.MICROSCOPIC SPEC OTHER STN: NORMAL
PATH REPORT.RELEVANT HX SPEC: NORMAL
UPPER EUS: NORMAL

## 2024-08-27 NOTE — RESULT ENCOUNTER NOTE
There was no evidence of malignancy on random liver biopsy or fine needle biopsy of hyperechoic area below the cysts. Tumor markers is persistently normal. Suspicion for cholangiocarcinoma is very low based on these findings together with the negative results so far

## 2024-09-04 LAB
PATH REPORT.ADDENDUM SPEC: NORMAL
PATH REPORT.COMMENTS IMP SPEC: NORMAL
PATH REPORT.FINAL DX SPEC: NORMAL
PATH REPORT.GROSS SPEC: NORMAL
PATH REPORT.MICROSCOPIC SPEC OTHER STN: NORMAL
PATH REPORT.RELEVANT HX SPEC: NORMAL
PHOTO IMAGE: NORMAL

## 2024-09-05 ENCOUNTER — PATIENT OUTREACH (OUTPATIENT)
Dept: GASTROENTEROLOGY | Facility: CLINIC | Age: 79
End: 2024-09-05
Payer: COMMERCIAL

## 2024-09-05 ENCOUNTER — MEDICAL CORRESPONDENCE (OUTPATIENT)
Dept: HEALTH INFORMATION MANAGEMENT | Facility: CLINIC | Age: 79
End: 2024-09-05
Payer: COMMERCIAL

## 2024-09-05 DIAGNOSIS — K86.2 PANCREATIC CYST: Primary | ICD-10-CM

## 2024-09-05 NOTE — PROGRESS NOTES
"Post EUS and ERCP on 8/21/24 with Dr. Molina.      Follow-up recommendations:   EUS:   - Will proceed with ERCP.                          - Await path results.                          - Previously Ca 19-9 was normal. I will repeat Ca 19-9                          today to confirm it is still normal.                          - I discussed with referring provider Dr George.                          Based on today's EUS examination, and sustained normal                          Ca 19-9, improving LFTs, it is less likely that                          cholangiocarcinoma is the underlying diagnosis.     ERCP:    - Will repeat Ca 19-9 and have the MRIs re-read by our                          radiologists                          - Will await for the liver biopsy results                          - Confirm spontaneous stent passage by performing a                          flat and upright abdominal x-ray in 4 weeks.                          - If stent is present,will need an upper endoscopy                          under moderate conscious sedation in Unit J for                          biliary stent removal.     Following review of pathology, per Dr. Molina: There was no evidence of malignancy on random liver biopsy or fine needle biopsy of hyperechoic area below the cysts. Tumor markers is persistently normal. Suspicion for cholangiocarcinoma is very low based on these findings together with the negative results so far.     Patient states: Patient reports feeling \"okay\" since procedure. Mild, occasional right sided abdominal pain.     Orders placed:   -Abdomen xray (due 9/18/24); Added to Panc/bili stent log.  Patient requests order be sent to [facility] in [place] ([address] [place] [address]).    Reviewed post procedure recommendations and discussed symptoms to report to our clinic. Patient articulated understanding.     Clinic contact and scheduling numbers verified for future questions/concerns.   "   Tamika Michelle, RN Care Coordinator

## 2024-10-06 ENCOUNTER — HEALTH MAINTENANCE LETTER (OUTPATIENT)
Age: 79
End: 2024-10-06

## 2025-01-13 ENCOUNTER — DOCUMENTATION ONLY (OUTPATIENT)
Dept: GASTROENTEROLOGY | Facility: CLINIC | Age: 80
End: 2025-01-13
Payer: COMMERCIAL

## 2025-01-13 NOTE — PROGRESS NOTES
Requested imaging for XR ABDOMEN 2 VIEWS 09/16/2024   [facility]   Phone: [phone]   Fax: [phone]     Claire CARSON MA

## (undated) DEVICE — TUBING SUCTION 10'X3/16" N510

## (undated) DEVICE — SOL WATER IRRIG 1000ML BOTTLE 2F7114

## (undated) DEVICE — SOL NACL 0.9% IRRIG 1000ML BOTTLE 2F7124

## (undated) DEVICE — Device

## (undated) DEVICE — PACK ENDOSCOPY GI CUSTOM UMMC

## (undated) DEVICE — BIOPSY VALVE BIOSHIELD 00711135

## (undated) DEVICE — SUCTION MANIFOLD NEPTUNE 2 SYS 4 PORT 0702-020-000

## (undated) DEVICE — ENDO TUBING CO2 SMARTCAP STERILE DISP 100145CO2EXT

## (undated) DEVICE — KIT CONNECTOR FOR OLYMPUS ENDOSCOPES DEFENDO 100310

## (undated) DEVICE — ENDO BITE BLOCK ADULT OMNI-BLOC

## (undated) DEVICE — ENDO PROBE COVER ULTRASOUND BALLOON LATEX  MAJ-249

## (undated) DEVICE — KIT ENDO FIRST STEP DISINFECTANT 200ML W/POUCH EP-4

## (undated) DEVICE — CATH RETRIEVAL BALLOON EXTRACTOR PRO RX-S INJ ABOVE 9-12MM

## (undated) DEVICE — ENDO NDL ASPIRATION ULTRASOUND 19GA ACQUIRE M00555580

## (undated) DEVICE — ENDO ENDO DISTAL MAJ-2315

## (undated) DEVICE — ENDO CAP AND TUBING STERILE FOR ENDOGATOR  100130

## (undated) DEVICE — ENDO FUSION OMNI-TOME G31903

## (undated) DEVICE — WIRE GUIDE 0.025"X450CM STR VISIGLIDE G-240-2545S

## (undated) RX ORDER — LEVOFLOXACIN 5 MG/ML
INJECTION, SOLUTION INTRAVENOUS
Status: DISPENSED
Start: 2024-08-21

## (undated) RX ORDER — FENTANYL CITRATE 50 UG/ML
INJECTION, SOLUTION INTRAMUSCULAR; INTRAVENOUS
Status: DISPENSED
Start: 2024-08-21